# Patient Record
Sex: FEMALE | Race: BLACK OR AFRICAN AMERICAN | NOT HISPANIC OR LATINO | URBAN - METROPOLITAN AREA
[De-identification: names, ages, dates, MRNs, and addresses within clinical notes are randomized per-mention and may not be internally consistent; named-entity substitution may affect disease eponyms.]

---

## 2018-04-01 ENCOUNTER — OUTPATIENT (OUTPATIENT)
Dept: OUTPATIENT SERVICES | Facility: HOSPITAL | Age: 58
LOS: 1 days | End: 2018-04-01
Payer: MEDICAID

## 2018-04-01 PROCEDURE — G9001: CPT

## 2018-04-13 DIAGNOSIS — R69 ILLNESS, UNSPECIFIED: ICD-10-CM

## 2020-07-31 ENCOUNTER — TRANSCRIPTION ENCOUNTER (OUTPATIENT)
Age: 60
End: 2020-07-31

## 2021-04-28 ENCOUNTER — EMERGENCY (EMERGENCY)
Facility: HOSPITAL | Age: 61
LOS: 1 days | Discharge: HOME | End: 2021-04-28
Attending: EMERGENCY MEDICINE
Payer: MEDICARE

## 2021-04-28 VITALS
SYSTOLIC BLOOD PRESSURE: 157 MMHG | TEMPERATURE: 98 F | HEIGHT: 63 IN | WEIGHT: 186.07 LBS | RESPIRATION RATE: 16 BRPM | OXYGEN SATURATION: 96 % | DIASTOLIC BLOOD PRESSURE: 87 MMHG | HEART RATE: 71 BPM

## 2021-04-28 VITALS
SYSTOLIC BLOOD PRESSURE: 124 MMHG | TEMPERATURE: 98 F | DIASTOLIC BLOOD PRESSURE: 73 MMHG | OXYGEN SATURATION: 99 % | RESPIRATION RATE: 16 BRPM | HEART RATE: 60 BPM

## 2021-04-28 PROCEDURE — 99284 EMERGENCY DEPT VISIT MOD MDM: CPT

## 2021-04-28 PROCEDURE — 93971 EXTREMITY STUDY: CPT | Mod: 26,RT

## 2021-04-28 PROCEDURE — 73552 X-RAY EXAM OF FEMUR 2/>: CPT | Mod: 26,RT

## 2021-04-28 PROCEDURE — 73552 X-RAY EXAM OF FEMUR 2/>: CPT

## 2021-04-28 PROCEDURE — 93971 EXTREMITY STUDY: CPT

## 2021-04-28 PROCEDURE — 99284 EMERGENCY DEPT VISIT MOD MDM: CPT | Mod: 25

## 2021-04-28 RX ORDER — ACETAMINOPHEN 500 MG
975 TABLET ORAL ONCE
Refills: 0 | Status: COMPLETED | OUTPATIENT
Start: 2021-04-28 | End: 2021-04-28

## 2021-04-28 RX ADMIN — Medication 975 MILLIGRAM(S): at 13:58

## 2021-04-28 NOTE — ED PROVIDER NOTE - MUSCULOSKELETAL, MLM
Right posterior thigh tenderness at distal hamstrings/semitendinosis. Right 2+ DP/PT pulses. Normal sensation

## 2021-04-28 NOTE — ED PROVIDER NOTE - NSFOLLOWUPINSTRUCTIONS_ED_ALL_ED_FT
You were seen in the ER for right leg pain.     Please follow up with orthopedic surgery in 5-7 days.     Please take motrin or tylenol as needed for the pain and as directed.     Please return to the ER for any new or worsening symptoms.

## 2021-04-28 NOTE — ED PROVIDER NOTE - OBJECTIVE STATEMENT
62 y/o female presenting with right posterior thigh pain, which started 1.5 weeks ago. Patient states she was stretching when she started having the pain. Pt states she did not feel a "pop". Gradual onset, mild-moderate severity, no exacerbating or alleviating factors, no associated symptoms. Denies any knee pain, LE numbness/tingling or weakness, fever, or chills.

## 2021-04-28 NOTE — ED ADULT NURSE NOTE - OBJECTIVE STATEMENT
61 y.o F A&Ox3 with PMH of HTN presents to the ED c.o pain behind R knee since 4/24. Pt. reports pain developed after stretching on Saturday. Describes discomfort as "stiff and aching" sensation, rates pain level 6/10 at this time. Pain is worse during activity. Denies taking pain medication today. Pt. able to bend knee without difficulty. Reports swelling to knee region is her baseline. No redness noted. Denies any history of blood clots. Pt. is not on blood thinners. Denies fevers, chills, dizziness, HA, SOB, CP, palpitations, tingling sensation, at this time. Safety and comfort provided.

## 2021-04-28 NOTE — ED PROVIDER NOTE - PATIENT PORTAL LINK FT
Yes
You can access the FollowMyHealth Patient Portal offered by [facility] by registering at the following website: [email or web address]. By joining [facility]’s FollowMyHealth portal, you will also be able to view your health information using other applications (apps) compatible with our system.

## 2021-04-28 NOTE — ED PROVIDER NOTE - CLINICAL SUMMARY MEDICAL DECISION MAKING FREE TEXT BOX
DDX; muscle strain, DVT, fracture  Plan: xray right femur, US right LE DDX; muscle strain, DVT, fracture  Plan: xray right femur, US right LE    Xray right femur unremarkable.  US right LE shows no evidence of DVT.   Patient most likely with hamstring strain.     Patient will follow up with ortho clinic in 5-7 days.   Given return precautions. DDX; muscle strain, DVT, fracture  Plan: xray right femur, US right LE        Attending Statement: Agree with the above.  Likely clinical R hamstring strain c swelling somwhat c/f deep venous thrombosis.  Plan as above, nsaids, likely d/c.  --BMM

## 2021-04-28 NOTE — ED ADULT NURSE REASSESSMENT NOTE - NS ED NURSE REASSESS COMMENT FT1
As per MD, pt. ready for discharge at this time. Pt. advised to follow up with orthopedic surgeon in 5-7 days and return if any new or worsening symptoms occur. VSS/NAD.

## 2021-04-28 NOTE — ED PROVIDER NOTE - PROGRESS NOTE DETAILS
Xray right femur unremarkable.  US right LE shows no evidence of DVT.   Patient most likely with hamstring strain.     Patient will follow up with ortho clinic in 5-7 days.   Given return precautions.

## 2021-10-08 PROBLEM — Z00.00 ENCOUNTER FOR PREVENTIVE HEALTH EXAMINATION: Status: ACTIVE | Noted: 2021-10-08

## 2021-11-03 ENCOUNTER — APPOINTMENT (OUTPATIENT)
Dept: ORTHOPEDIC SURGERY | Facility: CLINIC | Age: 61
End: 2021-11-03
Payer: MEDICARE

## 2021-11-03 VITALS
BODY MASS INDEX: 32.96 KG/M2 | WEIGHT: 186 LBS | HEIGHT: 63 IN | HEART RATE: 73 BPM | DIASTOLIC BLOOD PRESSURE: 68 MMHG | SYSTOLIC BLOOD PRESSURE: 127 MMHG | OXYGEN SATURATION: 95 %

## 2021-11-03 DIAGNOSIS — Z86.79 PERSONAL HISTORY OF OTHER DISEASES OF THE CIRCULATORY SYSTEM: ICD-10-CM

## 2021-11-03 DIAGNOSIS — M77.11 LATERAL EPICONDYLITIS, RIGHT ELBOW: ICD-10-CM

## 2021-11-03 DIAGNOSIS — M25.531 PAIN IN RIGHT WRIST: ICD-10-CM

## 2021-11-03 PROCEDURE — TE001: CPT | Mod: RT,25

## 2021-11-03 PROCEDURE — 73080 X-RAY EXAM OF ELBOW: CPT | Mod: RT

## 2021-11-03 PROCEDURE — 73110 X-RAY EXAM OF WRIST: CPT | Mod: RT

## 2021-11-03 PROCEDURE — 99204 OFFICE O/P NEW MOD 45 MIN: CPT | Mod: 25

## 2021-11-03 RX ORDER — MELOXICAM 15 MG/1
15 TABLET ORAL
Qty: 30 | Refills: 2 | Status: ACTIVE | COMMUNITY
Start: 2021-11-03 | End: 1900-01-01

## 2022-03-30 ENCOUNTER — APPOINTMENT (OUTPATIENT)
Dept: CARDIOLOGY | Facility: CLINIC | Age: 62
End: 2022-03-30

## 2022-04-26 ENCOUNTER — APPOINTMENT (OUTPATIENT)
Dept: OBGYN | Facility: CLINIC | Age: 62
End: 2022-04-26
Payer: MEDICARE

## 2022-04-26 VITALS
BODY MASS INDEX: 37.92 KG/M2 | WEIGHT: 214 LBS | HEIGHT: 63 IN | DIASTOLIC BLOOD PRESSURE: 82 MMHG | SYSTOLIC BLOOD PRESSURE: 140 MMHG

## 2022-04-26 DIAGNOSIS — Z86.39 PERSONAL HISTORY OF OTHER ENDOCRINE, NUTRITIONAL AND METABOLIC DISEASE: ICD-10-CM

## 2022-04-26 DIAGNOSIS — Z01.419 ENCOUNTER FOR GYNECOLOGICAL EXAMINATION (GENERAL) (ROUTINE) W/OUT ABNORMAL FINDINGS: ICD-10-CM

## 2022-04-26 DIAGNOSIS — Z87.39 PERSONAL HISTORY OF OTHER DISEASES OF THE MUSCULOSKELETAL SYSTEM AND CONNECTIVE TISSUE: ICD-10-CM

## 2022-04-26 PROCEDURE — 99386 PREV VISIT NEW AGE 40-64: CPT

## 2022-04-29 PROBLEM — Z86.39 HISTORY OF HYPERCHOLESTEROLEMIA: Status: RESOLVED | Noted: 2022-04-26 | Resolved: 2022-04-29

## 2022-04-29 PROBLEM — Z87.39 HISTORY OF ARTHRITIS: Status: RESOLVED | Noted: 2022-04-26 | Resolved: 2022-04-29

## 2022-04-29 RX ORDER — ATORVASTATIN CALCIUM 80 MG/1
TABLET, FILM COATED ORAL
Refills: 0 | Status: ACTIVE | COMMUNITY

## 2022-05-01 LAB — CYTOLOGY CVX/VAG DOC THIN PREP: NORMAL

## 2024-06-03 ENCOUNTER — NON-APPOINTMENT (OUTPATIENT)
Age: 64
End: 2024-06-03

## 2024-06-03 ENCOUNTER — APPOINTMENT (OUTPATIENT)
Dept: OPHTHALMOLOGY | Facility: CLINIC | Age: 64
End: 2024-06-03
Payer: MEDICARE

## 2024-06-03 PROCEDURE — 92004 COMPRE OPH EXAM NEW PT 1/>: CPT
